# Patient Record
Sex: FEMALE | Race: WHITE | NOT HISPANIC OR LATINO | Employment: UNEMPLOYED | URBAN - METROPOLITAN AREA
[De-identification: names, ages, dates, MRNs, and addresses within clinical notes are randomized per-mention and may not be internally consistent; named-entity substitution may affect disease eponyms.]

---

## 2024-07-30 ENCOUNTER — TREATMENT (OUTPATIENT)
Dept: PHYSICAL THERAPY | Facility: CLINIC | Age: 39
End: 2024-07-30
Payer: COMMERCIAL

## 2024-07-30 DIAGNOSIS — M54.50 CHRONIC BILATERAL LOW BACK PAIN WITHOUT SCIATICA: Primary | ICD-10-CM

## 2024-07-30 DIAGNOSIS — G89.29 CHRONIC BILATERAL LOW BACK PAIN WITHOUT SCIATICA: Primary | ICD-10-CM

## 2024-07-30 NOTE — PROGRESS NOTES
"  Physical Therapy Initial Evaluation and Plan of Care             [address] [facility] [address]    [place] [address]    Patient: Johnnie Alejandro   : 1985  Diagnosis/ICD-10 Code:  Chronic bilateral low back pain without sciatica [M54.50, G89.29]  Referring practitioner: Self Referring  Date of Initial Visit: 2024  Today's Date: 2024  Patient seen for 1 session         Visit Diagnoses:    ICD-10-CM ICD-9-CM   1. Chronic bilateral low back pain without sciatica  M54.50 724.2    G89.29 338.29         Subjective Questionnaire: Oswestry: =18% impairment       Subjective Evaluation    History of Present Illness  Mechanism of injury: Low back pn for several years, no KOLTON. Has had 5 pregnancies/deliveries, 3 of which resulted in coccygeal fractures. Believes those injuries caused pelvic misalignment, LLD. Wears a heel lift in the L shoe. Sees a DO [place] that has managed symptoms w/ manipulations up to now, but symptoms have worsened recently. Last Friday spent time sitting on the floor, went to grocery store the next day, reached to a high shelf, and back went into a spasm. Back always feels on verge of \"going out\". Has cut back on exercise to walking and light yoga, back aggravated with attempted strengthening. Pn bilateral low back, can radiate into R lateral thigh at times, especially at night when sleeping. R low back feels like it could spasm. Pn nearly constant, varies in intensity. Aggravated by riding in car long distances, prolonged sitting. 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     Access Code: [email or web address]  URL: [email or web address]  Date: 08/01/2024  Prepared by: Vonnie aTnner    Exercises  - Supine Bridge  - 1 x daily - 15-30 reps  - Supine Posterior Pelvic Tilt  - 1 x daily - 10 reps - 10 sec hold  - Supine March with Posterior Pelvic Tilt  - 1 x daily - 15-30 reps  - Supine Piriformis Stretch  - 1 x daily - 3 reps - 15 sec hold  - Supine Piriformis Stretch with Leg Straight  - 1 x daily - 3 reps - 15 sec hold    Objective          Postural Observations    Additional Postural Observation Details  Static standing, mild leftward shift, R hip elevated  In supine apparent L LLD  Mild R rib hump w/ forward bend    Palpation   Left   Tenderness of the erector spinae, lumbar paraspinals and quadratus lumborum.     Right Tenderness of the erector spinae, lumbar paraspinals and quadratus lumborum.     Tenderness     Left Hip   Tenderness in the sacroiliac joint.     Right Hip   Tenderness in the sacroiliac joint.     Active Range of Motion     Additional Active Range of Motion Details  Trunk ROM in all planes  Mild soreness in low back w/ SB, otherwise pn free  Hip mobility intact and pn free    Strength/Myotome Testing     Left Hip   Planes of Motion   Flexion: 4-  Extension: 4-  Abduction: 5  External rotation: 4    Right Hip   Planes of Motion   Flexion: 4+  Extension: 4-  External rotation: 4+    Left Knee   Flexion: 4+  Extension: 5    Right Knee   Flexion: 4+  Extension: 5    Tests       Thoracic   Negative slump.     Lumbar     Right   Positive quadrant.   Negative passive SLR.     Right Pelvic Girdle/Sacrum   Positive: sacral spring and thigh thrust.   Negative: sacrum compression and gapping.     Lumbar Flexibility Comments:   Hamstring: mild impairment  Quad: no impairment  Hip Flexor: no impairment  Piriformis: mild impairment            Assessment & Plan       Assessment  Impairments: abnormal or  restricted ROM, activity intolerance, impaired physical strength, lacks appropriate home exercise program and pain with function   Functional limitations: carrying objects, lifting, sleeping, walking, pulling, pushing, uncomfortable because of pain, moving in bed, sitting, standing, stooping and unable to perform repetitive tasks   Assessment details: Pt is a 39 YOF who presents to PT w/ complaint of acute on chronic LBP R>L. Has had 5 pregnancies/deliveries and hx coccygeal fx x3, per her report contributing to L LLD. Uses L heel lift, undergoes chiropractic care through DO. Demonstrates signs of B SIJ dysfunction, likely resulting from asymmetrical lumbopelvic alignment and consequent soft tissue adaptations. Pt could benefit from skilled PT services to address deficits, decrease pn, and allow return to PLOF.  Barriers to therapy: n/a  Prognosis: good    Goals  Plan Goals: LTG 6 wks  1) Pt to be independent w/ long term HEP and self mgmt.  2) Pt able to return to usual exercise/recreation w/ min to no back pn.  3) Pt to improve B hip strength to 4+/5 or better in all planes.  4) Pt to improve Mod Oswestry score to 5/50 or better to reflect improved pn and function.    Plan  Therapy options: will be seen for skilled therapy services  Planned modality interventions: TENS, thermotherapy (hydrocollator packs), traction, ultrasound, cryotherapy and dry needling  Planned therapy interventions: abdominal trunk stabilization, ADL retraining, body mechanics training, flexibility, functional ROM exercises, home exercise program, joint mobilization, manual therapy, neuromuscular re-education, postural training, soft tissue mobilization, spinal/joint mobilization, strengthening, stretching and therapeutic activities  Frequency: 1x week  Duration in weeks: 12  Treatment plan discussed with: patient  Plan details: TE/TA/NMR/MT to address noted deficits, modalities as indicated for pn control.        History # of Personal  Factors and/or Comorbidities: LOW (0)  Examination of Body System(s): # of elements: LOW (1-2)  Clinical Presentation: EVOLVING  Clinical Decision Making: LOW       Timed:         Manual Therapy:    0     mins  99396;     Therapeutic Exercise:    10     mins  52171;     Neuromuscular Aleyda:    10    mins  57925;    Therapeutic Activity:     0     mins  68301;     Gait Trainin     mins  14000;     Ultrasound:     0     mins  00244;    Ionto                               0    mins   96217  Self Care                       0     mins   66537  Canalith Repos    0     mins 93446      Un-Timed:  Electrical Stimulation:    0     mins  14604 ( );  Dry Needling     0     mins self-pay  Traction     0     mins 82717  Low Eval     30     Mins  21979  Mod Eval     0     Mins  27898  High Eval                       0     Mins  77418        Timed Treatment:   20   mins   Total Treatment:     50   mins          PT: Vonnie Tanner, PT     [place] License: #[id number]    Electronically signed by Vonnie Tanner, PT, 24, 1:43 PM EDT    Certification Period: 2024 thru 10/29/2024  I certify that the therapy services are furnished while this patient is under my care.  The services outlined above are required by this patient, and will be reviewed every 90 days.         Physician Signature:__________________________________________________    PHYSICIAN: Referring, Self      DATE:     Please sign and return via fax to [phone]. Thank you, [facility].

## 2024-08-13 ENCOUNTER — TREATMENT (OUTPATIENT)
Dept: PHYSICAL THERAPY | Facility: CLINIC | Age: 39
End: 2024-08-13
Payer: COMMERCIAL

## 2024-08-13 DIAGNOSIS — G89.29 CHRONIC BILATERAL LOW BACK PAIN WITHOUT SCIATICA: Primary | ICD-10-CM

## 2024-08-13 DIAGNOSIS — M54.50 CHRONIC BILATERAL LOW BACK PAIN WITHOUT SCIATICA: Primary | ICD-10-CM

## 2024-08-13 PROCEDURE — 97110 THERAPEUTIC EXERCISES: CPT | Performed by: PHYSICAL THERAPIST

## 2024-08-13 PROCEDURE — 97112 NEUROMUSCULAR REEDUCATION: CPT | Performed by: PHYSICAL THERAPIST

## 2024-08-13 NOTE — PROGRESS NOTES
Physical Therapy Daily Treatment Note                  [address] [facility] [address] [facility] [address]  [place] [address]      Patient: Johnnie Alejandro   : 1985  Diagnosis/ICD-10 Code:  Chronic bilateral low back pain without sciatica [M54.50, G89.29]  Referring practitioner: Self Referring  Date of Initial Visit: Type: THERAPY  Noted: 2024  Today's Date: 2024  Patient seen for 2 sessions             Subjective   Johnnie Alejandro reports: doing much better already. Not having as much pain at the end of a the day and noticed she was able to go shopping for a while and had no pain afterwards with all the standing and walking.     Objective   See Exercise, Manual, and Modality Logs for complete treatment.       Assessment/Plan  Pt is seeing results with given HEP. Progressed exercises to challenge strength with no symptoms only muscle fatigue. Pt may benefit from progressions in HEP if she continues to improve quickly.   Progress per Plan of Care and Progress strengthening /stabilization /functional activity           Timed:  Manual Therapy:         mins  22265;  Therapeutic Exercise:    18     mins  82436;     Neuromuscular Aleyda:    15    mins  69853;    Therapeutic Activity:          mins  14538;     Gait Training:           mins  30227;     Ultrasound:          mins  62636;    Electrical Stimulation:         mins  36801;  Iontophoresis          mins  59083    Untimed:  Electrical Stimulation:         mins  06535 ( );  Mechanical Traction:         mins  18945;   Fluidotherapy          mins  67010    Timed Treatment:   33   mins   Total Treatment:     33   mins        Vonnie Loza PTA  Physical Therapist Assistant

## 2024-08-22 ENCOUNTER — TREATMENT (OUTPATIENT)
Dept: PHYSICAL THERAPY | Facility: CLINIC | Age: 39
End: 2024-08-22
Payer: COMMERCIAL

## 2024-08-22 DIAGNOSIS — M54.50 CHRONIC BILATERAL LOW BACK PAIN WITHOUT SCIATICA: Primary | ICD-10-CM

## 2024-08-22 DIAGNOSIS — G89.29 CHRONIC BILATERAL LOW BACK PAIN WITHOUT SCIATICA: Primary | ICD-10-CM

## 2024-08-22 PROCEDURE — 97014 ELECTRIC STIMULATION THERAPY: CPT | Performed by: PHYSICAL THERAPIST

## 2024-08-22 PROCEDURE — 97110 THERAPEUTIC EXERCISES: CPT | Performed by: PHYSICAL THERAPIST

## 2024-08-22 PROCEDURE — 97140 MANUAL THERAPY 1/> REGIONS: CPT | Performed by: PHYSICAL THERAPIST

## 2024-08-22 NOTE — PROGRESS NOTES
Physical Therapy Daily Treatment Note          Patient: Johnnie Alejandro   : 1985  Referring practitioner: Self Referring  Date of Initial Visit: Type: THERAPY  Noted: 2024  Today's Date: 2024  Patient seen for 3 sessions       Visit Diagnoses:    ICD-10-CM ICD-9-CM   1. Chronic bilateral low back pain without sciatica  M54.50 724.2    G89.29 338.29       Subjective   Pt states she experienced mild muscle spasms after previous visit with progression of exercises. States she has bee compliant with HEP provided at initial visit and tolerating well.   States she has increased tightness (R) low back into hip.    Objective   See Exercise, Manual, and Modality Logs for complete treatment.       Assessment/Plan  Progressed lumbar mobility and lumbar abdominal strengthening with good tolerance. Improved symptoms noted with (B) LE traction.   Trial of MHP/IFC to low back completed at end of session.   Pt to continue with HEP. Progress as tolerated.       Timed:         Manual Therapy:    8    mins  04735;     Therapeutic Exercise:    20     mins  92402;     Neuromuscular Aleyda:        mins  55040;    Therapeutic Activity:          mins  02705;     Gait Training:           mins  49501;     Ultrasound:          mins  98428;    Ionto                                   mins   37227  Self Care                            mins   07871      Un-Timed:  Electrical Stimulation:    15     mins  59521 ( );  Dry Needling          mins self-pay  Traction          mins 33379      Timed Treatment:   43   mins   Total Treatment:     43   mins    Magui Gutierrez PTA

## 2024-08-27 ENCOUNTER — TREATMENT (OUTPATIENT)
Dept: PHYSICAL THERAPY | Facility: CLINIC | Age: 39
End: 2024-08-27
Payer: COMMERCIAL

## 2024-08-27 DIAGNOSIS — M54.50 CHRONIC BILATERAL LOW BACK PAIN WITHOUT SCIATICA: Primary | ICD-10-CM

## 2024-08-27 DIAGNOSIS — G89.29 CHRONIC BILATERAL LOW BACK PAIN WITHOUT SCIATICA: Primary | ICD-10-CM

## 2024-08-27 PROCEDURE — 97116 GAIT TRAINING THERAPY: CPT | Performed by: PHYSICAL THERAPIST

## 2024-08-27 PROCEDURE — 97110 THERAPEUTIC EXERCISES: CPT | Performed by: PHYSICAL THERAPIST

## 2024-08-27 PROCEDURE — 97112 NEUROMUSCULAR REEDUCATION: CPT | Performed by: PHYSICAL THERAPIST

## 2024-08-27 PROCEDURE — 97530 THERAPEUTIC ACTIVITIES: CPT | Performed by: PHYSICAL THERAPIST

## 2024-08-27 NOTE — PROGRESS NOTES
Physical Therapy Daily Treatment Note                  [address] [facility] [address]  [place] [address]      Patient: Johnnie Alejandro   : 1985  Diagnosis/ICD-10 Code:  Chronic bilateral low back pain without sciatica [M54.50, G89.29]  Referring practitioner: Self Referring  Date of Initial Visit: Type: THERAPY  Noted: 2024  Today's Date: 2024  Patient seen for 4 sessions             Subjective   Johnnie Alejandro reports: did not have any spasms after last visit and was able to complete them at home with no issue. Able to note a decrease in back fatigue and pain this past week with job and home demands. Would like to go over stairs.     Objective   See Exercise, Manual, and Modality Logs for complete treatment.       Assessment/Plan  Progressed to more standing activities to challenge core and hip strength. She did need constant cueing to engage but mostly self cues. Education and demonstration on muscle group activation at the steps to decrease back discomfort and improve gross motor control without compensations.   Gave updated exercises for her to start at home if she does okay with recovery today. 30 day reassessment with be next visit and may be ready for independent HEP.     Access Code: [email or web address]  URL: [email or web address]  Date: 2024  Prepared by: Vonnie Loza    Exercises  - Supine Pelvic Tilt with Straight Leg Raise  - 1 x daily - 7 x weekly - 2 sets - 10 reps  - Side Plank on Knees  - 1 x daily - 7 x weekly - 3 sets - 10 sec hold  - Standing Anti-Rotation Press with Anchored Resistance  - 1 x daily - 7 x weekly - 1 sets - 15 reps  - Shoulder Extension with Resistance  - 1 x daily - 7 x weekly - 1 sets - 10 reps  Anticipate DC next Visit           Timed:  Manual Therapy:         mins  71096;  Therapeutic Exercise:    10     mins  23530;     Neuromuscular Aleyda:    15    mins  60287;    Therapeutic Activity:     8     mins  46017;     Gait Training:      10      mins  67406;     Ultrasound:          mins  95319;    Electrical Stimulation:         mins  71585;  Iontophoresis          mins  53797    Untimed:  Electrical Stimulation:         mins  48353 ( );  Mechanical Traction:         mins  91544;   Fluidotherapy          mins  20863    Timed Treatment:   43   mins   Total Treatment:     43   mins        Vonnie Loza PTA  Physical Therapist Assistant

## 2024-09-03 ENCOUNTER — TREATMENT (OUTPATIENT)
Dept: PHYSICAL THERAPY | Facility: CLINIC | Age: 39
End: 2024-09-03
Payer: COMMERCIAL

## 2024-09-03 DIAGNOSIS — G89.29 CHRONIC BILATERAL LOW BACK PAIN WITHOUT SCIATICA: Primary | ICD-10-CM

## 2024-09-03 DIAGNOSIS — M54.50 CHRONIC BILATERAL LOW BACK PAIN WITHOUT SCIATICA: Primary | ICD-10-CM

## 2024-09-03 PROCEDURE — 97530 THERAPEUTIC ACTIVITIES: CPT | Performed by: PHYSICAL THERAPIST

## 2024-09-03 PROCEDURE — 97110 THERAPEUTIC EXERCISES: CPT | Performed by: PHYSICAL THERAPIST

## 2024-09-03 PROCEDURE — 97112 NEUROMUSCULAR REEDUCATION: CPT | Performed by: PHYSICAL THERAPIST

## 2024-09-03 NOTE — PROGRESS NOTES
Physical Therapy Reassessment  [address] [facility] [address]    [place] [address]  Patient: Johnnie Alejandro   : 1985  Diagnosis/ICD-10 Code:  Chronic bilateral low back pain without sciatica [M54.50, G89.29]  Referring practitioner: Self Referring  Date of Initial Visit: 9/3/2024  Today's Date: 9/3/2024  Patient seen for 5 sessions         Visit Diagnoses:    ICD-10-CM ICD-9-CM   1. Chronic bilateral low back pain without sciatica  M54.50 724.2    G89.29 338.29       Subjective Questionnaire: Oswestry: =4% impairment   Clinical Progress: improved  Home Program Compliance: Yes  Treatment has included: therapeutic exercise, neuromuscular re-education, manual therapy, and therapeutic activity      Subjective   Johnnie Alejandro reports: No recent back spasms, feeling much better.    Pre tx pn score: 0  Post tx pn score: 0    Treatment  See Exercise, Manual, and Modality Logs for complete treatment.     Access Code: [email or web address]  URL: [email or web address]  Date: 2024  Prepared by: Vonnie Tanner    Exercises  - Supine Pelvic Tilt with Straight Leg Raise  - 1 x daily - 7 x weekly - 2 sets - 10 reps  - Side Plank on Knees  - 1 x daily - 7 x weekly - 3 sets - 10 sec hold  - Supine Bridge with Knee Extension and Pelvic Floor Contraction  - 1 x daily - 10-15 reps  - Standing Anti-Rotation Press with Anchored Resistance  - 1 x daily - 7 x weekly - 1 sets - 15 reps  - Shoulder Extension with Resistance  - 1 x daily - 7 x weekly - 1 sets - 10 reps  - Chest Press with Resistance  - 1 x daily - 15-30 reps  - Standing Diagonal Chop  - 1 x daily - 10-15 reps    Objective          Postural Observations    Additional Postural Observation Details  Static standing, mild leftward shift, R hip elevated  In supine apparent L LLD  Mild R rib hump w/ forward bend    Palpation   Left   No palpable tenderness to the erector spinae, lumbar paraspinals and quadratus lumborum.     Right   No palpable tenderness to the erector  spinae, lumbar paraspinals and quadratus lumborum.     Tenderness     Left Hip   Tenderness in the sacroiliac joint.     Right Hip   Tenderness in the sacroiliac joint.     Active Range of Motion     Additional Active Range of Motion Details  Trunk ROM WNL in all planes  Mild tightness L low back w/ R SB  Hip mobility intact and pn free    Strength/Myotome Testing     Left Hip   Planes of Motion   Flexion: 4+  Extension: 5  Abduction: 5  External rotation: 5    Right Hip   Planes of Motion   Flexion: 5  Extension: 5  External rotation: 5    Left Knee   Flexion: 5  Extension: 5    Right Knee   Flexion: 5  Extension: 5    Tests       Thoracic   Negative slump.     Lumbar     Right   Negative passive SLR and quadrant.     Right Pelvic Girdle/Sacrum   Negative: sacrum compression, gapping, sacral spring and thigh thrust.     Lumbar Flexibility Comments:   Hamstring: mild impairment  Quad: no impairment  Hip Flexor: no impairment  Piriformis: mild impairment            Assessment & Plan       Assessment  Assessment details: Reassessment performed. Pt demonstrates significant improvements in BLE strength, dec TTP lumbosacral spine, functional and non painful trunk mobility, improved awareness of deep core stabilizer activation. She has met all goals set for PT and reports resolution of lower back discomfort/spasms. She requests to trial independent mgmt w/ updated HEP provided today.    Goals  Plan Goals: LTG 6 wks  1) Pt to be independent w/ long term HEP and self mgmt.-MET  2) Pt able to return to usual exercise/recreation w/ min to no back pn.-MET  3) Pt to improve B hip strength to 4+/5 or better in all planes.-MET  4) Pt to improve Mod Oswestry score to 5/50 or better to reflect improved pn and function.-MET        Plan  Plan details: Trial independent HEP, return to clinic as needed for HEP modification or should symptoms return        Progress toward previous goals: All Met        Timed:         Manual Therapy:     0     mins  50715;     Therapeutic Exercise:    20     mins  19789;     Neuromuscular Aleyda:    10    mins  15512;    Therapeutic Activity:     15     mins  25432;     Gait Trainin     mins  43155;     Ultrasound:     0     mins  21085;    Ionto                               0    mins   62231  Self Care                       0     mins   42187  Canalith Repos    0     mins 37994      Un-Timed:  Electrical Stimulation:    0     mins  83176 ( );  Dry Needling     0     mins self-pay  Traction     0     mins 57096  Re-Eval                           0    mins  07446      Timed Treatment:   45   mins   Total Treatment:     45   mins          PT: Vonnie Tanner, PT     [place] License: #[id number]    Electronically signed by Vonnie Tanner, PT, 24, 2:00 PM EDT